# Patient Record
Sex: FEMALE | Race: WHITE | NOT HISPANIC OR LATINO | Employment: OTHER | URBAN - METROPOLITAN AREA
[De-identification: names, ages, dates, MRNs, and addresses within clinical notes are randomized per-mention and may not be internally consistent; named-entity substitution may affect disease eponyms.]

---

## 2017-02-06 ENCOUNTER — COMMUNICATION - HEALTHEAST (OUTPATIENT)
Dept: INTERNAL MEDICINE | Facility: CLINIC | Age: 57
End: 2017-02-06

## 2017-04-27 ENCOUNTER — COMMUNICATION - HEALTHEAST (OUTPATIENT)
Dept: INTERNAL MEDICINE | Facility: CLINIC | Age: 57
End: 2017-04-27

## 2017-04-27 DIAGNOSIS — G47.00 INSOMNIA: ICD-10-CM

## 2017-04-27 RX ORDER — TRAZODONE HYDROCHLORIDE 50 MG/1
TABLET, FILM COATED ORAL
Qty: 90 TABLET | Refills: 2 | Status: SHIPPED | OUTPATIENT
Start: 2017-04-27 | End: 2021-12-09

## 2017-05-26 ENCOUNTER — COMMUNICATION - HEALTHEAST (OUTPATIENT)
Dept: INTERNAL MEDICINE | Facility: CLINIC | Age: 57
End: 2017-05-26

## 2017-05-26 DIAGNOSIS — E03.8 OTHER SPECIFIED HYPOTHYROIDISM: ICD-10-CM

## 2017-05-27 RX ORDER — THYROID 30 MG/1
45 TABLET ORAL DAILY
Qty: 135 TABLET | Refills: 0 | Status: SHIPPED | OUTPATIENT
Start: 2017-05-27 | End: 2021-12-09

## 2018-10-11 ENCOUNTER — RECORDS - HEALTHEAST (OUTPATIENT)
Dept: ADMINISTRATIVE | Facility: OTHER | Age: 58
End: 2018-10-11

## 2018-11-30 ENCOUNTER — RECORDS - HEALTHEAST (OUTPATIENT)
Dept: BONE DENSITY | Facility: CLINIC | Age: 58
End: 2018-11-30

## 2018-11-30 ENCOUNTER — RECORDS - HEALTHEAST (OUTPATIENT)
Dept: ADMINISTRATIVE | Facility: OTHER | Age: 58
End: 2018-11-30

## 2018-11-30 DIAGNOSIS — M85.80 OTHER SPECIFIED DISORDERS OF BONE DENSITY AND STRUCTURE, UNSPECIFIED SITE: ICD-10-CM

## 2019-06-27 ENCOUNTER — RECORDS - HEALTHEAST (OUTPATIENT)
Dept: ADMINISTRATIVE | Facility: OTHER | Age: 59
End: 2019-06-27

## 2019-10-18 ENCOUNTER — RECORDS - HEALTHEAST (OUTPATIENT)
Dept: ADMINISTRATIVE | Facility: OTHER | Age: 59
End: 2019-10-18

## 2019-10-23 ENCOUNTER — RECORDS - HEALTHEAST (OUTPATIENT)
Dept: ADMINISTRATIVE | Facility: OTHER | Age: 59
End: 2019-10-23

## 2019-10-24 ENCOUNTER — RECORDS - HEALTHEAST (OUTPATIENT)
Dept: ADMINISTRATIVE | Facility: OTHER | Age: 59
End: 2019-10-24

## 2019-10-28 ENCOUNTER — RECORDS - HEALTHEAST (OUTPATIENT)
Dept: ADMINISTRATIVE | Facility: OTHER | Age: 59
End: 2019-10-28

## 2019-12-02 ENCOUNTER — RECORDS - HEALTHEAST (OUTPATIENT)
Dept: BONE DENSITY | Facility: CLINIC | Age: 59
End: 2019-12-02

## 2019-12-02 DIAGNOSIS — Z78.0 ASYMPTOMATIC MENOPAUSAL STATE: ICD-10-CM

## 2019-12-02 DIAGNOSIS — M81.0 AGE-RELATED OSTEOPOROSIS WITHOUT CURRENT PATHOLOGICAL FRACTURE: ICD-10-CM

## 2019-12-06 ENCOUNTER — RECORDS - HEALTHEAST (OUTPATIENT)
Dept: ADMINISTRATIVE | Facility: OTHER | Age: 59
End: 2019-12-06

## 2020-11-25 ENCOUNTER — APPOINTMENT (OUTPATIENT)
Dept: URBAN - METROPOLITAN AREA CLINIC 260 | Age: 60
Setting detail: DERMATOLOGY
End: 2020-11-27

## 2020-11-25 VITALS — RESPIRATION RATE: 14 BRPM | WEIGHT: 120 LBS | HEIGHT: 63 IN

## 2020-11-25 DIAGNOSIS — L81.4 OTHER MELANIN HYPERPIGMENTATION: ICD-10-CM

## 2020-11-25 DIAGNOSIS — D22 MELANOCYTIC NEVI: ICD-10-CM

## 2020-11-25 DIAGNOSIS — L82.1 OTHER SEBORRHEIC KERATOSIS: ICD-10-CM

## 2020-11-25 DIAGNOSIS — Z71.89 OTHER SPECIFIED COUNSELING: ICD-10-CM

## 2020-11-25 PROBLEM — D48.5 NEOPLASM OF UNCERTAIN BEHAVIOR OF SKIN: Status: ACTIVE | Noted: 2020-11-25

## 2020-11-25 PROBLEM — D22.5 MELANOCYTIC NEVI OF TRUNK: Status: ACTIVE | Noted: 2020-11-25

## 2020-11-25 PROCEDURE — 99203 OFFICE O/P NEW LOW 30 MIN: CPT | Mod: 25

## 2020-11-25 PROCEDURE — 11102 TANGNTL BX SKIN SINGLE LES: CPT

## 2020-11-25 PROCEDURE — OTHER COUNSELING: OTHER

## 2020-11-25 PROCEDURE — OTHER PATHOLOGY BILLING: OTHER

## 2020-11-25 PROCEDURE — OTHER BIOPSY BY SHAVE METHOD: OTHER

## 2020-11-25 PROCEDURE — 88305 TISSUE EXAM BY PATHOLOGIST: CPT

## 2020-11-25 ASSESSMENT — LOCATION SIMPLE DESCRIPTION DERM
LOCATION SIMPLE: UPPER BACK
LOCATION SIMPLE: RIGHT UPPER BACK

## 2020-11-25 ASSESSMENT — LOCATION DETAILED DESCRIPTION DERM
LOCATION DETAILED: RIGHT INFERIOR UPPER BACK
LOCATION DETAILED: INFERIOR THORACIC SPINE

## 2020-11-25 ASSESSMENT — LOCATION ZONE DERM: LOCATION ZONE: TRUNK

## 2020-11-25 NOTE — PROCEDURE: COUNSELING
Detail Level: Detailed
Detail Level: Simple
Detail Level: Generalized
Patient Specific Counseling (Will Not Stick From Patient To Patient): Discussed laser treatments and hydroquinone. Patient has already tried the cream and didn’t see much improvement.

## 2020-11-25 NOTE — PROCEDURE: PATHOLOGY BILLING
Immunohistochemistry ([id number] and [id number]) billing is not performed here. Please use the Immunohistochemistry Stain Billing plan to accomplish this. Immunohistochemistry ([id number] and [id number]) billing is not performed here. Please use the Immunohistochemistry Stain Billing plan to accomplish this.

## 2021-08-05 ENCOUNTER — TRANSFERRED RECORDS (OUTPATIENT)
Dept: MULTI SPECIALTY CLINIC | Facility: CLINIC | Age: 61
End: 2021-08-05

## 2021-08-22 ENCOUNTER — HEALTH MAINTENANCE LETTER (OUTPATIENT)
Age: 61
End: 2021-08-22

## 2021-09-02 ENCOUNTER — TRANSFERRED RECORDS (OUTPATIENT)
Dept: HEALTH INFORMATION MANAGEMENT | Facility: CLINIC | Age: 61
End: 2021-09-02

## 2021-09-20 ENCOUNTER — ANCILLARY PROCEDURE (OUTPATIENT)
Dept: BONE DENSITY | Facility: CLINIC | Age: 61
End: 2021-09-20
Attending: INTERNAL MEDICINE
Payer: COMMERCIAL

## 2021-09-20 DIAGNOSIS — Z13.820 SCREENING FOR OSTEOPOROSIS: ICD-10-CM

## 2021-09-20 PROCEDURE — 77080 DXA BONE DENSITY AXIAL: CPT | Mod: TC | Performed by: RADIOLOGY

## 2021-10-17 ENCOUNTER — HEALTH MAINTENANCE LETTER (OUTPATIENT)
Age: 61
End: 2021-10-17

## 2021-12-09 ENCOUNTER — OFFICE VISIT (OUTPATIENT)
Dept: INTERNAL MEDICINE | Facility: CLINIC | Age: 61
End: 2021-12-09
Payer: COMMERCIAL

## 2021-12-09 VITALS
HEIGHT: 63 IN | SYSTOLIC BLOOD PRESSURE: 120 MMHG | OXYGEN SATURATION: 98 % | HEART RATE: 70 BPM | DIASTOLIC BLOOD PRESSURE: 70 MMHG | BODY MASS INDEX: 21.45 KG/M2

## 2021-12-09 DIAGNOSIS — E03.9 ACQUIRED HYPOTHYROIDISM: ICD-10-CM

## 2021-12-09 DIAGNOSIS — M81.8 OSTEOPOROSIS, IDIOPATHIC: Primary | ICD-10-CM

## 2021-12-09 LAB
ALBUMIN SERPL-MCNC: 4.1 G/DL (ref 3.5–5)
ALP SERPL-CCNC: 96 U/L (ref 45–120)
ALT SERPL W P-5'-P-CCNC: 23 U/L (ref 0–45)
ANION GAP SERPL CALCULATED.3IONS-SCNC: 11 MMOL/L (ref 5–18)
AST SERPL W P-5'-P-CCNC: 28 U/L (ref 0–40)
BILIRUB SERPL-MCNC: 0.2 MG/DL (ref 0–1)
BUN SERPL-MCNC: 18 MG/DL (ref 8–22)
CALCIUM SERPL-MCNC: 9.5 MG/DL (ref 8.5–10.5)
CHLORIDE BLD-SCNC: 102 MMOL/L (ref 98–107)
CO2 SERPL-SCNC: 28 MMOL/L (ref 22–31)
CREAT SERPL-MCNC: 0.74 MG/DL (ref 0.6–1.1)
ERYTHROCYTE [DISTWIDTH] IN BLOOD BY AUTOMATED COUNT: 12.5 % (ref 10–15)
GFR SERPL CREATININE-BSD FRML MDRD: 86 ML/MIN/1.73M2
GLUCOSE BLD-MCNC: 99 MG/DL (ref 70–125)
HCT VFR BLD AUTO: 42.4 % (ref 35–47)
HGB BLD-MCNC: 13.7 G/DL (ref 11.7–15.7)
MAGNESIUM SERPL-MCNC: 2.2 MG/DL (ref 1.8–2.6)
MCH RBC QN AUTO: 30.9 PG (ref 26.5–33)
MCHC RBC AUTO-ENTMCNC: 32.3 G/DL (ref 31.5–36.5)
MCV RBC AUTO: 96 FL (ref 78–100)
PLATELET # BLD AUTO: 235 10E3/UL (ref 150–450)
POTASSIUM BLD-SCNC: 4.5 MMOL/L (ref 3.5–5)
PROT SERPL-MCNC: 7.2 G/DL (ref 6–8)
PTH-INTACT SERPL-MCNC: 54 PG/ML (ref 10–86)
RBC # BLD AUTO: 4.43 10E6/UL (ref 3.8–5.2)
SODIUM SERPL-SCNC: 141 MMOL/L (ref 136–145)
WBC # BLD AUTO: 5.3 10E3/UL (ref 4–11)

## 2021-12-09 PROCEDURE — 84165 PROTEIN E-PHORESIS SERUM: CPT | Performed by: PATHOLOGY

## 2021-12-09 PROCEDURE — 83516 IMMUNOASSAY NONANTIBODY: CPT | Mod: 59 | Performed by: INTERNAL MEDICINE

## 2021-12-09 PROCEDURE — 36415 COLL VENOUS BLD VENIPUNCTURE: CPT | Performed by: INTERNAL MEDICINE

## 2021-12-09 PROCEDURE — 84443 ASSAY THYROID STIM HORMONE: CPT | Performed by: INTERNAL MEDICINE

## 2021-12-09 PROCEDURE — 84166 PROTEIN E-PHORESIS/URINE/CSF: CPT | Performed by: PATHOLOGY

## 2021-12-09 PROCEDURE — 84080 ASSAY ALKALINE PHOSPHATASES: CPT | Mod: 90 | Performed by: INTERNAL MEDICINE

## 2021-12-09 PROCEDURE — 99000 SPECIMEN HANDLING OFFICE-LAB: CPT | Performed by: INTERNAL MEDICINE

## 2021-12-09 PROCEDURE — 83970 ASSAY OF PARATHORMONE: CPT | Performed by: INTERNAL MEDICINE

## 2021-12-09 PROCEDURE — 85027 COMPLETE CBC AUTOMATED: CPT | Performed by: INTERNAL MEDICINE

## 2021-12-09 PROCEDURE — 83735 ASSAY OF MAGNESIUM: CPT | Performed by: INTERNAL MEDICINE

## 2021-12-09 PROCEDURE — 80053 COMPREHEN METABOLIC PANEL: CPT | Performed by: INTERNAL MEDICINE

## 2021-12-09 PROCEDURE — 82306 VITAMIN D 25 HYDROXY: CPT | Performed by: INTERNAL MEDICINE

## 2021-12-09 PROCEDURE — 99204 OFFICE O/P NEW MOD 45 MIN: CPT | Performed by: INTERNAL MEDICINE

## 2021-12-09 PROCEDURE — 84155 ASSAY OF PROTEIN SERUM: CPT | Performed by: INTERNAL MEDICINE

## 2021-12-09 RX ORDER — LEVOTHYROXINE SODIUM 50 UG/1
50 TABLET ORAL DAILY
COMMUNITY
Start: 2021-11-22

## 2021-12-09 RX ORDER — CHLORAL HYDRATE 500 MG
3 CAPSULE ORAL
COMMUNITY

## 2021-12-10 LAB
DEPRECATED CALCIDIOL+CALCIFEROL SERPL-MC: 86 UG/L (ref 30–80)
TOTAL PROTEIN SERUM FOR ELP: 7.1 G/DL (ref 6–8)
TSH SERPL DL<=0.005 MIU/L-ACNC: 2.42 UIU/ML (ref 0.3–5)

## 2021-12-10 NOTE — PROGRESS NOTES
"Becca Mota was seen today for management of osteoporosis.  The last bone density scan was done on 9/20/21:  The L1-L2 T score is -2.1 and there could be some false elevation at  the other levels. The total proximal femur T-scores are -2.8 on the  left and -2.9 on the right. FRAX not measured.    Patient was treated in the past with Fosamax from 1/2020-1/2021, when medications topped because of the severe stomach pain.    Social history:  reports that she has never smoked. She has never used smokeless tobacco. She reports current alcohol use of about 7.0 standard drinks of alcohol per week. She reports that she does not use drugs.    Past medical history:   Patient Active Problem List   Diagnosis     Osteopenia     Hypothyroidism     Insomnia     Anxiety     History of fractures: none  FH: mother and sister have osteoporosis.     Family History   Problem Relation Age of Onset     Clotting Disorder Mother      Dementia Mother      Hypertension Mother      Alcoholism Father      Lung Cancer Maternal Aunt      Cerebrovascular Disease Maternal Grandfather      Dementia Maternal Grandmother        Diet and supplements: ProBono, vit D 5000 international unit(s) daily    Risk medications: levothyroxine    Gynecologic history: menopause age 47, no HRT        ROS:     Constitutional: Negative.    HENT: Negative.    Eyes: Negative.    Respiratory: Negative.    Cardiovascular: Negative.    Gastrointestinal: Negative.    Endocrine: Negative.    Genitourinary: Negative.    Musculoskeletal: Negative.    Skin: Negative.    Allergic/Immunologic: Negative.    Neurological: Negative.    Hematological: Negative.    Psychiatric/Behavioral: Negative.      PE:  /70 (BP Location: Right arm, Patient Position: Sitting, Cuff Size: Adult Regular)   Pulse 70   Ht 1.593 m (5' 2.72\")   SpO2 98%   BMI 21.45 kg/m    Constitutional:  oriented to person, place, and time, appears well-nourished. No distress.   HENT:   Head: Normocephalic. "   Eyes: Conjunctivae are normal.   Neck: Normal range of motion.     Pulmonary/Chest: Effort normal   Musculoskeletal: Normal range of motion.   Neurological: alert and oriented to person, place, and time.   Psychiatric: normal mood and affect.         Impression:   (M81.8) Osteoporosis, idiopathic  (primary encounter diagnosis)  Comment: Patient was treated with Fosamax for about a year, stopped in jan 2021 because of the GI pain.  We discussed anabolic agents as Evenity, Forteo and Tymlos and printed info provided.  We also discussed antiresoprtive agents as Reclast and Prolia. She had a lot of concerns about side effects.  I would recommend starting with Evenity if we can get it covered by her insurance. After 12 months on Evenity, can switch to Reclast.  Workup for secondary causes will be done today.  Plan: Bone specific alk phosphatase, CBC with         platelets, Comprehensive metabolic panel,         Vitamin D Deficiency, Tissue transglutaminase         angela IgA and IgG, Protein electrophoresis random        urine, Protein electrophoresis, Parathyroid         Hormone Intact, Magnesium, Calcium timed urine         with Creat Ratio            (E03.9) Acquired hypothyroidism  Comment: On levothyroxine, Hyperthyroid state can cause more bone density loss. TSH should be 2-3.  Plan: TSH              Plan:  1. The patient will take 1200 - 1500 mg of calcium daily from the diet and supplements.  2. The patient will take 2000 IU of vit D daily.  3. Treatment options discussed with the patient   4. I am asking for follow up in 4 weeks .        Thank you for the opportunity to participate in the care of your patient. If you have any additional questions, do not hesitate to contact me at [facility].    This note has been dictated using voice recognition software. Any grammatical or context distortions are unintentional and inherent to the software      With best personal regards,   Della Chavez MD,  MD, CCD  12/9/2021

## 2021-12-13 LAB
ALP BONE SERPL-MCNC: 12.8 UG/L
TTG IGA SER-ACNC: 0.7 U/ML
TTG IGG SER-ACNC: <0.6 U/ML

## 2021-12-14 LAB
ALBUMIN PERCENT: 67.5 % (ref 51–67)
ALBUMIN SERPL ELPH-MCNC: 4.8 G/DL (ref 3.2–4.7)
ALPHA 1 PERCENT: 2.2 % (ref 2–4)
ALPHA 2 PERCENT: 9.3 % (ref 5–13)
ALPHA1 GLOB SERPL ELPH-MCNC: 0.2 G/DL (ref 0.1–0.3)
ALPHA2 GLOB SERPL ELPH-MCNC: 0.7 G/DL (ref 0.4–0.9)
B-GLOBULIN SERPL ELPH-MCNC: 0.7 G/DL (ref 0.7–1.2)
BETA PERCENT: 9.6 % (ref 10–17)
GAMMA GLOB SERPL ELPH-MCNC: 0.8 G/DL (ref 0.6–1.4)
GAMMA GLOBULIN PERCENT: 11.4 % (ref 9–20)
PATH ICD:: ABNORMAL
PATH ICD:: NORMAL
PROT PATTERN SERPL ELPH-IMP: ABNORMAL
PROT PATTERN UR ELPH-IMP: NORMAL
REVIEWING PATHOLOGIST: ABNORMAL
REVIEWING PATHOLOGIST: NORMAL
TOTAL PROTEIN RANDOM URINE MG/DL: <7 MG/DL
TOTAL PROTEIN SERUM FOR ELP (SYNCED VALUE): 7.1 G/DL

## 2021-12-15 ENCOUNTER — LAB (OUTPATIENT)
Dept: LAB | Facility: CLINIC | Age: 61
End: 2021-12-15
Payer: COMMERCIAL

## 2021-12-15 DIAGNOSIS — M81.8 OSTEOPOROSIS, IDIOPATHIC: ICD-10-CM

## 2021-12-15 LAB
CALCIUM 24H UR-MRATE: 0.14 G/SPEC
CALCIUM UR-MCNC: 7.4 MG/DL
CALCIUM/CREAT UR: 0.31 G/G CR
COLLECT DURATION TIME UR: 24 H
CREAT 24H UR-MRATE: 0.45 G/SPEC (ref 0.8–1.8)
CREAT UR-MCNC: 24 MG/DL
SPECIMEN VOL UR: 1875 ML

## 2021-12-15 PROCEDURE — 81050 URINALYSIS VOLUME MEASURE: CPT

## 2021-12-15 PROCEDURE — 82340 ASSAY OF CALCIUM IN URINE: CPT

## 2021-12-16 ENCOUNTER — TELEPHONE (OUTPATIENT)
Dept: INTERNAL MEDICINE | Facility: CLINIC | Age: 61
End: 2021-12-16
Payer: COMMERCIAL

## 2021-12-16 NOTE — TELEPHONE ENCOUNTER
Patient would like a call back to discuss lab results.  She wants to know if it's okay to move forward with reclast.

## 2021-12-20 NOTE — TELEPHONE ENCOUNTER
"Reason for Call:  Other call back    Detailed comments: Pt is wondering if this result \"  Protein electrophoresis \" can be read to her? She does have  reclass infusion on the 29th and is wanting to know before she continues with it.    Phone Number Patient can be reached at: Home number on file [phone] (home)    Best Time: any    Can we leave a detailed message on this number? YES    Call taken on 12/20/2021 at 9:25 AM by Heath Crystal      "

## 2021-12-23 NOTE — TELEPHONE ENCOUNTER
Reason for Call:  Other call back    Detailed comments: patient calling again in regards to this. She wants her questions answered before the infusion. Looks like Dr. HART is back on Tuesday and I told patient she can advise this then.     Phone Number Patient can be reached at: Home number on file [phone] (home)    Best Time: Any    Can we leave a detailed message on this number? YES    Call taken on 12/23/2021 at 3:42 PM by Penelope Kirk

## 2022-01-11 ENCOUNTER — OFFICE VISIT (OUTPATIENT)
Dept: INTERNAL MEDICINE | Facility: CLINIC | Age: 62
End: 2022-01-11
Payer: COMMERCIAL

## 2022-01-11 VITALS — SYSTOLIC BLOOD PRESSURE: 100 MMHG | DIASTOLIC BLOOD PRESSURE: 60 MMHG | HEART RATE: 62 BPM | OXYGEN SATURATION: 100 %

## 2022-01-11 DIAGNOSIS — M81.8 IDIOPATHIC OSTEOPOROSIS: ICD-10-CM

## 2022-01-11 DIAGNOSIS — E03.9 ACQUIRED HYPOTHYROIDISM: ICD-10-CM

## 2022-01-11 PROCEDURE — 99213 OFFICE O/P EST LOW 20 MIN: CPT | Performed by: INTERNAL MEDICINE

## 2022-01-11 NOTE — PROGRESS NOTES
(M81.8) Idiopathic osteoporosis  Comment: Reclast done 12/29/21 in [place]. She tolerated it well. We will do the DXA in 1 year and meet at that time. All labs from the visit in December reviewed today.  Plan: DX Hip/Pelvis/Spine            (E03.9) Acquired hypothyroidism  Comment: stable, THS 2.42, will continue same levothyroxine dose.            This note has been dictated using voice recognition software. Any grammatical or context distortions are unintentional and inherent to the software.      Return in about 1 year (around 1/11/2023) for Follow up, osteoporosis.    Patient Instructions   Reclast done 12/29/21 in [place].    We should repeat DXA scan in 1 year, Dec 2022 or Jan 2023.  I would like to see you with that result.          Subjective:    Becca Mota is a 61 year old female  here for follow up.      Social History     Socioeconomic History     Marital status:      Spouse name: Not on file     Number of children: Not on file     Years of education: Not on file     Highest education level: Not on file   Occupational History     Not on file   Tobacco Use     Smoking status: Never Smoker     Smokeless tobacco: Never Used   Substance and Sexual Activity     Alcohol use: Yes     Alcohol/week: 7.0 standard drinks     Drug use: No     Sexual activity: Yes     Partners: Male     Birth control/protection: None   Other Topics Concern     Not on file   Social History Narrative     Not on file     Social Determinants of Health     Financial Resource Strain: Not on file   Food Insecurity: Not on file   Transportation Needs: Not on file   Physical Activity: Not on file   Stress: Not on file   Social Connections: Not on file   Intimate Partner Violence: Not on file   Housing Stability: Not on file       Family History   Problem Relation Age of Onset     Clotting Disorder Mother      Dementia Mother      Hypertension Mother      Alcoholism Father      Lung Cancer Maternal Aunt      Cerebrovascular Disease  Maternal Grandfather      Dementia Maternal Grandmother      Review of Systems:  A 12 point comprehensive review of systems was negative except as noted in HPI.        Objective:    Physical Exam   /60   Pulse 62   SpO2 100%   There is no height or weight on file to calculate BMI.    Constitutional: oriented to person, place, and time, appears well-nourished. No distress.       Patient Active Problem List   Diagnosis     Hypothyroidism     Insomnia     Anxiety     Idiopathic osteoporosis       Current Outpatient Medications   Medication     BIOTIN PO     cholecalciferol (VITAMIN D3) 25 mcg (1000 units) capsule     fish oil-omega-3 fatty acids 1000 MG capsule     levothyroxine (SYNTHROID/LEVOTHROID) 50 MCG tablet     MAGNESIUM PO     melatonin 5 MG tablet     UNABLE TO FIND     No current facility-administered medications for this visit.               Della Chavez MD  1/11/2022

## 2022-10-01 ENCOUNTER — HEALTH MAINTENANCE LETTER (OUTPATIENT)
Age: 62
End: 2022-10-01

## 2022-12-07 ENCOUNTER — ANCILLARY PROCEDURE (OUTPATIENT)
Dept: BONE DENSITY | Facility: CLINIC | Age: 62
End: 2022-12-07
Attending: INTERNAL MEDICINE
Payer: COMMERCIAL

## 2022-12-07 ENCOUNTER — OFFICE VISIT (OUTPATIENT)
Dept: INTERNAL MEDICINE | Facility: CLINIC | Age: 62
End: 2022-12-07
Payer: COMMERCIAL

## 2022-12-07 VITALS
DIASTOLIC BLOOD PRESSURE: 64 MMHG | OXYGEN SATURATION: 100 % | RESPIRATION RATE: 16 BRPM | SYSTOLIC BLOOD PRESSURE: 98 MMHG | TEMPERATURE: 98 F | HEART RATE: 63 BPM

## 2022-12-07 DIAGNOSIS — M81.8 IDIOPATHIC OSTEOPOROSIS: Primary | ICD-10-CM

## 2022-12-07 DIAGNOSIS — E03.9 ACQUIRED HYPOTHYROIDISM: ICD-10-CM

## 2022-12-07 DIAGNOSIS — M81.8 IDIOPATHIC OSTEOPOROSIS: ICD-10-CM

## 2022-12-07 PROCEDURE — 99214 OFFICE O/P EST MOD 30 MIN: CPT | Performed by: INTERNAL MEDICINE

## 2022-12-07 PROCEDURE — 77080 DXA BONE DENSITY AXIAL: CPT | Mod: TC | Performed by: RADIOLOGY

## 2022-12-07 NOTE — PROGRESS NOTES
Assessment & Plan     Idiopathic osteoporosis  Patient was treated in the past with Fosamax from 1/2020-1/2021, when medications topped because of the difficulty swallowing.  History of fractures: none  FH: mother and sister have osteoporosis.  Negative workup for secondary causes of osteoporosis.  Risk medications: levothyroxine     Gynecologic history: menopause age 47, no HRT     Reclast done 12/29/21 in [place]. She tolerated it well.  We discussed anabolic agents as Evenity, Forteo and Tymlos and printed info provided.  We also discussed antiresoprtive agents as Reclast and Prolia.  DXA scan done today showed T-scores in osteoporosis range in the both hips and no significant change compared to DXA done in 2021.  She would like to continue Reclast infusion yearly and will schedule  # 2 dose at the end of December.    - DX Hip/Pelvis/Spine; Future    Acquired hypothyroidism  Component      Latest Ref Rng & Units 12/9/2021   TSH      0.30 - 5.00 uIU/mL 2.42     On 50 mcg levothyroxine.    Excessive thyroid hormone causes osteoporosis and fractures. This can occur when hyperthyroid disease goes untreated or patient is taking too much levothyroxine. One study of women with osteoporosis concluded that taking a high dose of thyroid hormone (>150 micrograms/day) increased their fracture risk more than a 50%.     Patient Instructions   Schedule Reclast # 2 at [facility].  Repeat DXA scan in Dec 2023.  Keep TSH 2-3. Hyperthyroidism can cause bone density loss.      Total time spent on the date of this encounter doing: chart review, review of test results, patient visit, physical exam, education, counseling, developing plan of care, and documenting =  30   minutes.    0956}      Return in about 1 year (around 12/7/2023) for Follow up, osteoporosis.    Della Chavez MD  [facility]    Juan Hudson is a 62 year old, presenting for the following health issues:  Osteoporosis (Osteo  F/U-DXA)      HPI           Review of Systems         Objective    BP 98/64   Pulse 63   Temp 98  F (36.7  C) (Oral)   Resp 16   SpO2 100%   There is no height or weight on file to calculate BMI.  Physical Exam

## 2022-12-07 NOTE — PATIENT INSTRUCTIONS
Schedule Reclast # 2 at [facility].  Repeat DXA scan in Dec 2023.  Keep TSH 2-3. Hyperthyroidism can cause bone density loss.

## 2023-09-29 ENCOUNTER — PATIENT OUTREACH (OUTPATIENT)
Dept: INTERNAL MEDICINE | Facility: CLINIC | Age: 63
End: 2023-09-29
Payer: COMMERCIAL

## 2023-09-29 NOTE — TELEPHONE ENCOUNTER
Patient Quality Outreach    Patient is due for the following:   Cervical Cancer Screening - PAP Needed  Physical Preventive Adult Physical    Next Steps:   No follow up needed at this time.    Type of outreach:    Chart review performed, no outreach needed.    Chart review completed for quality outreach and records indicate the patient receives primary care from another organization. No action needed from us at this time.      Questions for provider review:    None     Alisia Leos MA

## 2023-10-15 ENCOUNTER — HEALTH MAINTENANCE LETTER (OUTPATIENT)
Age: 63
End: 2023-10-15

## 2023-12-24 ENCOUNTER — HEALTH MAINTENANCE LETTER (OUTPATIENT)
Age: 63
End: 2023-12-24

## 2023-12-26 ENCOUNTER — TELEPHONE (OUTPATIENT)
Dept: INTERNAL MEDICINE | Facility: CLINIC | Age: 63
End: 2023-12-26
Payer: COMMERCIAL

## 2023-12-26 DIAGNOSIS — M81.8 IDIOPATHIC OSTEOPOROSIS: Primary | ICD-10-CM

## 2023-12-26 NOTE — TELEPHONE ENCOUNTER
12-26-23    Order/Referral Request    Who is requesting: patient     Orders being requested: dexa    Reason service is needed/diagnosis: routine     When are orders needed by: soon    Does patient have a preference on a Group/Provider/Facility? [facility]    Does patient have an appointment scheduled?: No    Where to send orders: Place orders within Epic    Could we send this information to you in [facility] or would you prefer to receive a phone call?:   Patient would prefer a phone call   Okay to leave a detailed message?: Yes at Cell number on file:    Telephone Information:   Mobile [phone]

## 2024-01-18 ENCOUNTER — OFFICE VISIT (OUTPATIENT)
Dept: INTERNAL MEDICINE | Facility: CLINIC | Age: 64
End: 2024-01-18
Payer: COMMERCIAL

## 2024-01-18 ENCOUNTER — ANCILLARY PROCEDURE (OUTPATIENT)
Dept: BONE DENSITY | Facility: CLINIC | Age: 64
End: 2024-01-18
Attending: INTERNAL MEDICINE
Payer: COMMERCIAL

## 2024-01-18 VITALS
OXYGEN SATURATION: 97 % | HEART RATE: 69 BPM | DIASTOLIC BLOOD PRESSURE: 62 MMHG | RESPIRATION RATE: 16 BRPM | SYSTOLIC BLOOD PRESSURE: 100 MMHG

## 2024-01-18 DIAGNOSIS — M81.8 IDIOPATHIC OSTEOPOROSIS: ICD-10-CM

## 2024-01-18 DIAGNOSIS — E03.9 ACQUIRED HYPOTHYROIDISM: ICD-10-CM

## 2024-01-18 DIAGNOSIS — M81.8 IDIOPATHIC OSTEOPOROSIS: Primary | ICD-10-CM

## 2024-01-18 PROCEDURE — 77080 DXA BONE DENSITY AXIAL: CPT | Mod: TC | Performed by: PHYSICIAN ASSISTANT

## 2024-01-18 PROCEDURE — 99213 OFFICE O/P EST LOW 20 MIN: CPT | Performed by: INTERNAL MEDICINE

## 2024-01-18 RX ORDER — ACETAMINOPHEN 325 MG/1
650 TABLET ORAL
Status: CANCELLED | OUTPATIENT
Start: 2024-01-25

## 2024-01-18 RX ORDER — SODIUM PHOSPHATE,MONO-DIBASIC 19G-7G/118
ENEMA (ML) RECTAL DAILY
COMMUNITY

## 2024-01-18 RX ORDER — ASPIRIN 81 MG/1
81 TABLET, CHEWABLE ORAL DAILY
COMMUNITY

## 2024-01-18 RX ORDER — HEPARIN SODIUM,PORCINE 10 UNIT/ML
5-20 VIAL (ML) INTRAVENOUS DAILY PRN
Status: CANCELLED | OUTPATIENT
Start: 2024-01-25

## 2024-01-18 RX ORDER — METHYLPREDNISOLONE SODIUM SUCCINATE 125 MG/2ML
125 INJECTION, POWDER, LYOPHILIZED, FOR SOLUTION INTRAMUSCULAR; INTRAVENOUS
Status: CANCELLED
Start: 2024-01-25

## 2024-01-18 RX ORDER — HEPARIN SODIUM (PORCINE) LOCK FLUSH IV SOLN 100 UNIT/ML 100 UNIT/ML
5 SOLUTION INTRAVENOUS
Status: CANCELLED | OUTPATIENT
Start: 2024-01-25

## 2024-01-18 RX ORDER — EPINEPHRINE 1 MG/ML
0.3 INJECTION, SOLUTION, CONCENTRATE INTRAVENOUS EVERY 5 MIN PRN
Status: CANCELLED | OUTPATIENT
Start: 2024-01-25

## 2024-01-18 RX ORDER — ALBUTEROL SULFATE 0.83 MG/ML
2.5 SOLUTION RESPIRATORY (INHALATION)
Status: CANCELLED | OUTPATIENT
Start: 2024-01-25

## 2024-01-18 RX ORDER — MEPERIDINE HYDROCHLORIDE 25 MG/ML
25 INJECTION INTRAMUSCULAR; INTRAVENOUS; SUBCUTANEOUS EVERY 30 MIN PRN
Status: CANCELLED | OUTPATIENT
Start: 2024-01-25

## 2024-01-18 RX ORDER — DIPHENHYDRAMINE HYDROCHLORIDE 50 MG/ML
50 INJECTION INTRAMUSCULAR; INTRAVENOUS
Status: CANCELLED
Start: 2024-01-25

## 2024-01-18 RX ORDER — ALBUTEROL SULFATE 90 UG/1
1-2 AEROSOL, METERED RESPIRATORY (INHALATION)
Status: CANCELLED
Start: 2024-01-25

## 2024-01-18 RX ORDER — ZOLEDRONIC ACID 5 MG/100ML
5 INJECTION, SOLUTION INTRAVENOUS ONCE
Status: CANCELLED
Start: 2024-01-25

## 2024-01-18 RX ORDER — CITALOPRAM HYDROBROMIDE 20 MG/1
20 TABLET ORAL AT BEDTIME
COMMUNITY

## 2024-01-18 NOTE — PROGRESS NOTES
Assessment & Plan     Idiopathic osteoporosis  Patient was treated in the past with Fosamax from 1/2020-1/2021, when medications stopped because of the difficulty swallowing.  History of fractures: none  FH: mother and sister have osteoporosis.  Negative workup for secondary causes of osteoporosis.  Risk medications: levothyroxine     Gynecologic history: menopause age 47, no HRT     Reclast done 12/29/21 and 2/22/23 in [place]. She tolerated it well.  We discussed anabolic agents as Evenity, Forteo and Tymlos .  We also discussed antiresoprtive agents as Reclast and Prolia.  DXA scan done today:  -Lumbar Spine: L1-L4: BMD: 0.950 g/cm2. T-score: -1.9. Z-score: -0.3.  -RIGHT Hip Total: BMD: 0.649 g/cm2. T-score: -2.8. Z-score: -1.7.  -RIGHT Hip Femoral neck: BMD: 0.683 g/cm2. T-score: -2.6. Z-score: -1.1.  -LEFT Hip Total: BMD: 0.681 g/cm2. T-score: -2.6. Z-score: -1.4.  -LEFT Hip Femoral neck: BMD: 0.743 g/cm2. T-score: -2.1. Z-score: -0.7.    Stable bone density with no statistically significant change compared to DXA in 2022.    She would like to continue Reclast infusion yearly and will schedule  # 3 dose in March.    She had vit D and basic labs checked by her PCP few weeks ago.    Acquired hypothyroidism  Stable on levothyroxine. TSH 3.3 checked by her PCP.                Juan Hudson is a 63 year old, presenting for the following health issues:  Osteoporosis (Discuss DXA)      1/18/2024     9:51 AM   Additional Questions   Roomed by Ivanna MARTINEZ                   Objective    /62   Pulse 69   Resp 16   LMP  (LMP Unknown)   SpO2 97%   There is no height or weight on file to calculate BMI.  Physical Exam               Signed Electronically by: Della Chavez MD

## 2024-01-18 NOTE — PATIENT INSTRUCTIONS
Reclast # 2 -  2/22/23.    You can schedule Reclast # 3 in March 2024.  infusion center -   Repeat DXA scan in 1 year. [phone]      Treatment Options discussed:   Bisphosphonates    IV - Zoledronic acid (Reclast) once a year for 3 years total      -discussed studies have shown that three years of zolendronic acid protects forthe following 3 years as much as being on zolendronic acid for 6 years straight           Flu-like symptoms can occur within the first 24-72 hours of your first infusion of the IVbisphosphonate. This includes low-grade fever, muscle and joint pains. Ibuprofen and/or Tylenol can help these symptoms. Low calcium levels can also occur with the IV bisphosphonates.     Osteonecrosis of the jaw(ONJ) has been rarely associated with bisphosphonate use for osteoporosis.The risk is approximately 1/1700-1/100,000, with development most likely related to invasive dental procedures (extractions, dental implants) and poordental hygiene. Be sure to continue regular dental visits and alert me and/or your dentist for any issues. If you do require invasive dental work, please contact me and we will stop the medication 3 months prior.      -The data available at this time suggests that there is probably a small increase risk of atypical (nontraumatic) subtrochanteric fractures of the femur in patients on bisphosphonate therapy compared to those not onit. One large study suggested that for every 100 fractures prevented with bisphosphonate therapy, less than one femur fracture will occur. Other studies suggest one episode per 2,500 patient years. Patient should call withleg pain.     -Calcium: total calcium intake should be 1200mg per day from dietary sources. If not achieved with diet alone, add in calcium tablet(s). No more than 500mg at one time. Dietary sources: 8 ounces of milk,4 ounces of yogurt or 1 ounce of cheese contain about 300mg calcium per serving, green veggies, almonds, beans, oranges, along  with various other plant sources.     -Vitamin D3 recommendations: 2000 IU daily.    -Bone Stimulating Exercise: impact and weight-bearing exercise like walking, jogging, yoga, [place], stair-climbing, dancing, dry aerobics, and tennis 3-5 times per week for at least 30 minutes & weight-lifting orresistance training 2 times per week, may improve your bone desity and increase your strength, thereby decreasing your risk of fracture.    -Dr. Rosenda Chavez has a book on Yoga for Osteoporosis and a 12 pose regimenyou can look up online.     -Fall Prevention: avoiding ice, use of [employer] http://www[employer]/ to cover shoes in the winter, avoiding loose gravel and uneven terrain, clearing house of cords, throw rugs,avoiding ladders, using caution with stairs and around dogs and small children.

## 2024-03-06 ENCOUNTER — LAB (OUTPATIENT)
Dept: INFUSION THERAPY | Facility: HOSPITAL | Age: 64
End: 2024-03-06
Attending: INTERNAL MEDICINE
Payer: MEDICARE

## 2024-03-06 VITALS
SYSTOLIC BLOOD PRESSURE: 105 MMHG | OXYGEN SATURATION: 99 % | TEMPERATURE: 98.7 F | BODY MASS INDEX: 20.79 KG/M2 | DIASTOLIC BLOOD PRESSURE: 64 MMHG | RESPIRATION RATE: 16 BRPM | WEIGHT: 116.3 LBS | HEART RATE: 58 BPM

## 2024-03-06 DIAGNOSIS — M81.8 IDIOPATHIC OSTEOPOROSIS: Primary | ICD-10-CM

## 2024-03-06 LAB
CALCIUM SERPL-MCNC: 9.1 MG/DL (ref 8.8–10.2)
CREAT SERPL-MCNC: 0.76 MG/DL (ref 0.51–0.95)
EGFRCR SERPLBLD CKD-EPI 2021: 86 ML/MIN/1.73M2

## 2024-03-06 PROCEDURE — 96365 THER/PROPH/DIAG IV INF INIT: CPT

## 2024-03-06 PROCEDURE — 250N000011 HC RX IP 250 OP 636: Mod: JZ | Performed by: INTERNAL MEDICINE

## 2024-03-06 PROCEDURE — 82310 ASSAY OF CALCIUM: CPT | Performed by: INTERNAL MEDICINE

## 2024-03-06 PROCEDURE — 258N000003 HC RX IP 258 OP 636: Performed by: INTERNAL MEDICINE

## 2024-03-06 PROCEDURE — 36415 COLL VENOUS BLD VENIPUNCTURE: CPT | Performed by: INTERNAL MEDICINE

## 2024-03-06 PROCEDURE — 82565 ASSAY OF CREATININE: CPT | Performed by: INTERNAL MEDICINE

## 2024-03-06 RX ORDER — HEPARIN SODIUM,PORCINE 10 UNIT/ML
5-20 VIAL (ML) INTRAVENOUS DAILY PRN
Status: CANCELLED | OUTPATIENT
Start: 2025-03-06

## 2024-03-06 RX ORDER — HEPARIN SODIUM (PORCINE) LOCK FLUSH IV SOLN 100 UNIT/ML 100 UNIT/ML
5 SOLUTION INTRAVENOUS
OUTPATIENT
Start: 2025-03-06

## 2024-03-06 RX ORDER — ALBUTEROL SULFATE 90 UG/1
1-2 AEROSOL, METERED RESPIRATORY (INHALATION)
Status: DISCONTINUED | OUTPATIENT
Start: 2024-03-06 | End: 2024-03-06 | Stop reason: HOSPADM

## 2024-03-06 RX ORDER — HEPARIN SODIUM (PORCINE) LOCK FLUSH IV SOLN 100 UNIT/ML 100 UNIT/ML
5 SOLUTION INTRAVENOUS
Status: CANCELLED | OUTPATIENT
Start: 2025-03-06

## 2024-03-06 RX ORDER — ACETAMINOPHEN 325 MG/1
650 TABLET ORAL
OUTPATIENT
Start: 2025-03-06

## 2024-03-06 RX ORDER — ALBUTEROL SULFATE 90 UG/1
1-2 AEROSOL, METERED RESPIRATORY (INHALATION)
Start: 2025-03-06

## 2024-03-06 RX ORDER — ZOLEDRONIC ACID 5 MG/100ML
5 INJECTION, SOLUTION INTRAVENOUS ONCE
Status: COMPLETED | OUTPATIENT
Start: 2024-03-06 | End: 2024-03-06

## 2024-03-06 RX ORDER — DIPHENHYDRAMINE HYDROCHLORIDE 50 MG/ML
50 INJECTION INTRAMUSCULAR; INTRAVENOUS
Start: 2025-03-06

## 2024-03-06 RX ORDER — ZOLEDRONIC ACID 5 MG/100ML
5 INJECTION, SOLUTION INTRAVENOUS ONCE
Status: CANCELLED
Start: 2025-03-06

## 2024-03-06 RX ORDER — EPINEPHRINE 1 MG/ML
0.3 INJECTION, SOLUTION INTRAMUSCULAR; SUBCUTANEOUS EVERY 5 MIN PRN
Status: CANCELLED | OUTPATIENT
Start: 2025-03-06

## 2024-03-06 RX ORDER — METHYLPREDNISOLONE SODIUM SUCCINATE 125 MG/2ML
125 INJECTION, POWDER, LYOPHILIZED, FOR SOLUTION INTRAMUSCULAR; INTRAVENOUS
Start: 2025-03-06

## 2024-03-06 RX ORDER — ZOLEDRONIC ACID 5 MG/100ML
5 INJECTION, SOLUTION INTRAVENOUS ONCE
Start: 2025-03-06

## 2024-03-06 RX ORDER — EPINEPHRINE 1 MG/ML
0.3 INJECTION, SOLUTION INTRAMUSCULAR; SUBCUTANEOUS EVERY 5 MIN PRN
Status: DISCONTINUED | OUTPATIENT
Start: 2024-03-06 | End: 2024-03-06 | Stop reason: HOSPADM

## 2024-03-06 RX ORDER — MEPERIDINE HYDROCHLORIDE 50 MG/ML
25 INJECTION INTRAMUSCULAR; INTRAVENOUS; SUBCUTANEOUS EVERY 30 MIN PRN
Status: CANCELLED | OUTPATIENT
Start: 2025-03-06

## 2024-03-06 RX ORDER — ALBUTEROL SULFATE 0.83 MG/ML
2.5 SOLUTION RESPIRATORY (INHALATION)
Status: DISCONTINUED | OUTPATIENT
Start: 2024-03-06 | End: 2024-03-06 | Stop reason: HOSPADM

## 2024-03-06 RX ORDER — ALBUTEROL SULFATE 90 UG/1
1-2 AEROSOL, METERED RESPIRATORY (INHALATION)
Status: CANCELLED
Start: 2025-03-06

## 2024-03-06 RX ORDER — HEPARIN SODIUM,PORCINE 10 UNIT/ML
5-20 VIAL (ML) INTRAVENOUS DAILY PRN
OUTPATIENT
Start: 2025-03-06

## 2024-03-06 RX ORDER — MEPERIDINE HYDROCHLORIDE 50 MG/ML
25 INJECTION INTRAMUSCULAR; INTRAVENOUS; SUBCUTANEOUS EVERY 30 MIN PRN
OUTPATIENT
Start: 2025-03-06

## 2024-03-06 RX ORDER — EPINEPHRINE 1 MG/ML
0.3 INJECTION, SOLUTION INTRAMUSCULAR; SUBCUTANEOUS EVERY 5 MIN PRN
OUTPATIENT
Start: 2025-03-06

## 2024-03-06 RX ORDER — DIPHENHYDRAMINE HYDROCHLORIDE 50 MG/ML
50 INJECTION INTRAMUSCULAR; INTRAVENOUS
Status: DISCONTINUED | OUTPATIENT
Start: 2024-03-06 | End: 2024-03-06 | Stop reason: HOSPADM

## 2024-03-06 RX ORDER — ALBUTEROL SULFATE 0.83 MG/ML
2.5 SOLUTION RESPIRATORY (INHALATION)
OUTPATIENT
Start: 2025-03-06

## 2024-03-06 RX ORDER — ACETAMINOPHEN 325 MG/1
650 TABLET ORAL
Status: CANCELLED | OUTPATIENT
Start: 2025-03-06

## 2024-03-06 RX ORDER — ALBUTEROL SULFATE 0.83 MG/ML
2.5 SOLUTION RESPIRATORY (INHALATION)
Status: CANCELLED | OUTPATIENT
Start: 2025-03-06

## 2024-03-06 RX ORDER — ACETAMINOPHEN 325 MG/1
650 TABLET ORAL
Status: DISCONTINUED | OUTPATIENT
Start: 2024-03-06 | End: 2024-03-06 | Stop reason: HOSPADM

## 2024-03-06 RX ORDER — MEPERIDINE HYDROCHLORIDE 50 MG/ML
25 INJECTION INTRAMUSCULAR; INTRAVENOUS; SUBCUTANEOUS EVERY 30 MIN PRN
Status: DISCONTINUED | OUTPATIENT
Start: 2024-03-06 | End: 2024-03-06 | Stop reason: HOSPADM

## 2024-03-06 RX ORDER — DIPHENHYDRAMINE HYDROCHLORIDE 50 MG/ML
50 INJECTION INTRAMUSCULAR; INTRAVENOUS
Status: CANCELLED
Start: 2025-03-06

## 2024-03-06 RX ORDER — METHYLPREDNISOLONE SODIUM SUCCINATE 125 MG/2ML
125 INJECTION, POWDER, LYOPHILIZED, FOR SOLUTION INTRAMUSCULAR; INTRAVENOUS
Status: CANCELLED
Start: 2025-03-06

## 2024-03-06 RX ORDER — METHYLPREDNISOLONE SODIUM SUCCINATE 125 MG/2ML
125 INJECTION, POWDER, LYOPHILIZED, FOR SOLUTION INTRAMUSCULAR; INTRAVENOUS
Status: DISCONTINUED | OUTPATIENT
Start: 2024-03-06 | End: 2024-03-06 | Stop reason: HOSPADM

## 2024-03-06 RX ADMIN — ZOLEDRONIC ACID 5 MG: 5 INJECTION, SOLUTION INTRAVENOUS at 10:35

## 2024-03-06 RX ADMIN — SODIUM CHLORIDE 250 ML: 9 INJECTION, SOLUTION INTRAVENOUS at 10:35

## 2024-03-06 NOTE — PROGRESS NOTES
Infusion Nursing Note:  Becca Mota presents today for reclast.    Patient seen by provider today: No   present during visit today: Not Applicable.    Note: Pt was given printout about reclast and side effects. No pre meds given..      Intravenous Access:  Peripheral IV placed.    Treatment Conditions:  Results reviewed, labs MET treatment parameters, ok to proceed with treatment.      Post Infusion Assessment:  Patient tolerated infusion without incident.  Access discontinued per protocol.       Discharge Plan:   Patient and/or family verbalized understanding of discharge instructions and all questions answered.      Itzel Romeo RN

## 2025-01-18 ENCOUNTER — HEALTH MAINTENANCE LETTER (OUTPATIENT)
Age: 65
End: 2025-01-18